# Patient Record
Sex: MALE | Race: WHITE | NOT HISPANIC OR LATINO | Employment: FULL TIME | URBAN - METROPOLITAN AREA
[De-identification: names, ages, dates, MRNs, and addresses within clinical notes are randomized per-mention and may not be internally consistent; named-entity substitution may affect disease eponyms.]

---

## 2018-08-23 DIAGNOSIS — I10 ESSENTIAL HYPERTENSION: ICD-10-CM

## 2018-08-23 DIAGNOSIS — M25.50 MULTIPLE JOINT PAIN: ICD-10-CM

## 2018-08-23 DIAGNOSIS — R53.82 CHRONIC FATIGUE: ICD-10-CM

## 2018-08-23 DIAGNOSIS — E66.01 OBESITY, CLASS III, BMI 40-49.9 (MORBID OBESITY) (HCC): Primary | ICD-10-CM

## 2018-08-23 PROBLEM — R06.83 SNORING: Status: ACTIVE | Noted: 2018-08-23

## 2018-08-23 PROBLEM — M10.9 GOUT: Status: ACTIVE | Noted: 2018-08-23

## 2018-08-24 ENCOUNTER — APPOINTMENT (OUTPATIENT)
Dept: LAB | Facility: HOSPITAL | Age: 43
End: 2018-08-24

## 2018-08-24 ENCOUNTER — CONSULT (OUTPATIENT)
Dept: BARIATRICS/WEIGHT MGMT | Facility: CLINIC | Age: 43
End: 2018-08-24

## 2018-08-24 VITALS
WEIGHT: 315 LBS | SYSTOLIC BLOOD PRESSURE: 148 MMHG | RESPIRATION RATE: 18 BRPM | HEART RATE: 76 BPM | HEIGHT: 73 IN | TEMPERATURE: 98.1 F | BODY MASS INDEX: 41.75 KG/M2 | DIASTOLIC BLOOD PRESSURE: 94 MMHG

## 2018-08-24 DIAGNOSIS — K21.9 GASTROESOPHAGEAL REFLUX DISEASE, ESOPHAGITIS PRESENCE NOT SPECIFIED: ICD-10-CM

## 2018-08-24 DIAGNOSIS — E66.01 OBESITY, CLASS III, BMI 40-49.9 (MORBID OBESITY) (HCC): Primary | ICD-10-CM

## 2018-08-24 DIAGNOSIS — I10 ESSENTIAL HYPERTENSION: ICD-10-CM

## 2018-08-24 DIAGNOSIS — R60.9 EDEMA, UNSPECIFIED TYPE: ICD-10-CM

## 2018-08-24 DIAGNOSIS — M25.50 MULTIPLE JOINT PAIN: ICD-10-CM

## 2018-08-24 DIAGNOSIS — K76.0 FATTY LIVER: ICD-10-CM

## 2018-08-24 DIAGNOSIS — E78.5 BORDERLINE HYPERLIPIDEMIA: ICD-10-CM

## 2018-08-24 DIAGNOSIS — R06.83 SNORING: ICD-10-CM

## 2018-08-24 PROBLEM — K82.9 GALLBLADDER PROBLEM: Status: RESOLVED | Noted: 2018-08-24 | Resolved: 2018-08-24

## 2018-08-24 PROBLEM — K82.9 GALLBLADDER PROBLEM: Status: ACTIVE | Noted: 2018-08-24

## 2018-08-24 LAB
ALBUMIN SERPL-MCNC: 4.7 G/DL (ref 3.5–5.2)
ALBUMIN/GLOB SERPL: 1.5 G/DL
ALP SERPL-CCNC: 59 U/L (ref 39–117)
ALT SERPL W P-5'-P-CCNC: 41 U/L (ref 1–41)
ANION GAP SERPL CALCULATED.3IONS-SCNC: 14.1 MMOL/L
AST SERPL-CCNC: 23 U/L (ref 1–40)
BASOPHILS # BLD AUTO: 0.04 10*3/MM3 (ref 0–0.2)
BASOPHILS NFR BLD AUTO: 0.4 % (ref 0–1.5)
BILIRUB SERPL-MCNC: 0.5 MG/DL (ref 0.1–1.2)
BUN BLD-MCNC: 18 MG/DL (ref 6–20)
BUN/CREAT SERPL: 16.1 (ref 7–25)
CALCIUM SPEC-SCNC: 10 MG/DL (ref 8.6–10.5)
CHLORIDE SERPL-SCNC: 99 MMOL/L (ref 98–107)
CHOLEST SERPL-MCNC: 192 MG/DL (ref 0–200)
CO2 SERPL-SCNC: 26.9 MMOL/L (ref 22–29)
CREAT BLD-MCNC: 1.12 MG/DL (ref 0.76–1.27)
DEPRECATED RDW RBC AUTO: 47.2 FL (ref 37–54)
EOSINOPHIL # BLD AUTO: 0.24 10*3/MM3 (ref 0–0.7)
EOSINOPHIL NFR BLD AUTO: 2.2 % (ref 0.3–6.2)
ERYTHROCYTE [DISTWIDTH] IN BLOOD BY AUTOMATED COUNT: 13.2 % (ref 11.5–14.5)
GFR SERPL CREATININE-BSD FRML MDRD: 72 ML/MIN/1.73
GLOBULIN UR ELPH-MCNC: 3.2 GM/DL
GLUCOSE BLD-MCNC: 89 MG/DL (ref 65–99)
HBA1C MFR BLD: 5.34 % (ref 4.8–5.6)
HCT VFR BLD AUTO: 50.2 % (ref 40.4–52.2)
HDLC SERPL-MCNC: 41 MG/DL (ref 40–60)
HGB BLD-MCNC: 16.2 G/DL (ref 13.7–17.6)
IMM GRANULOCYTES # BLD: 0.02 10*3/MM3 (ref 0–0.03)
IMM GRANULOCYTES NFR BLD: 0.2 % (ref 0–0.5)
LDLC SERPL CALC-MCNC: 113 MG/DL (ref 0–100)
LDLC/HDLC SERPL: 2.76 {RATIO}
LYMPHOCYTES # BLD AUTO: 3.37 10*3/MM3 (ref 0.9–4.8)
LYMPHOCYTES NFR BLD AUTO: 31.5 % (ref 19.6–45.3)
MCH RBC QN AUTO: 31.3 PG (ref 27–32.7)
MCHC RBC AUTO-ENTMCNC: 32.3 G/DL (ref 32.6–36.4)
MCV RBC AUTO: 97.1 FL (ref 79.8–96.2)
MONOCYTES # BLD AUTO: 0.75 10*3/MM3 (ref 0.2–1.2)
MONOCYTES NFR BLD AUTO: 7 % (ref 5–12)
NEUTROPHILS # BLD AUTO: 6.29 10*3/MM3 (ref 1.9–8.1)
NEUTROPHILS NFR BLD AUTO: 58.7 % (ref 42.7–76)
PLATELET # BLD AUTO: 228 10*3/MM3 (ref 140–500)
PMV BLD AUTO: 11.1 FL (ref 6–12)
POTASSIUM BLD-SCNC: 4.3 MMOL/L (ref 3.5–5.2)
PROT SERPL-MCNC: 7.9 G/DL (ref 6–8.5)
RBC # BLD AUTO: 5.17 10*6/MM3 (ref 4.6–6)
SODIUM BLD-SCNC: 140 MMOL/L (ref 136–145)
TRIGL SERPL-MCNC: 190 MG/DL (ref 0–150)
TSH SERPL DL<=0.05 MIU/L-ACNC: 2.26 MIU/ML (ref 0.27–4.2)
VLDLC SERPL-MCNC: 38 MG/DL (ref 5–40)
WBC NRBC COR # BLD: 10.71 10*3/MM3 (ref 4.5–10.7)

## 2018-08-24 PROCEDURE — 99205 OFFICE O/P NEW HI 60 MIN: CPT | Performed by: NURSE PRACTITIONER

## 2018-08-24 PROCEDURE — 80053 COMPREHEN METABOLIC PANEL: CPT | Performed by: NURSE PRACTITIONER

## 2018-08-24 PROCEDURE — 80061 LIPID PANEL: CPT | Performed by: NURSE PRACTITIONER

## 2018-08-24 PROCEDURE — 36415 COLL VENOUS BLD VENIPUNCTURE: CPT | Performed by: NURSE PRACTITIONER

## 2018-08-24 PROCEDURE — 84443 ASSAY THYROID STIM HORMONE: CPT | Performed by: NURSE PRACTITIONER

## 2018-08-24 PROCEDURE — 83036 HEMOGLOBIN GLYCOSYLATED A1C: CPT | Performed by: NURSE PRACTITIONER

## 2018-08-24 PROCEDURE — 85025 COMPLETE CBC W/AUTO DIFF WBC: CPT | Performed by: NURSE PRACTITIONER

## 2018-08-24 RX ORDER — CALCIUM CARBONATE 200(500)MG
1 TABLET,CHEWABLE ORAL AS NEEDED
COMMUNITY

## 2018-08-24 RX ORDER — INDOMETHACIN 50 MG/1
50 CAPSULE ORAL AS NEEDED
COMMUNITY
Start: 2018-08-23 | End: 2018-08-30

## 2018-08-24 RX ORDER — LOSARTAN POTASSIUM 50 MG/1
50 TABLET ORAL DAILY
Refills: 0 | COMMUNITY
Start: 2018-08-16

## 2018-08-24 NOTE — PROGRESS NOTES
"Bariatric Nutrition Counseling Interview    Patient Name:  Jonnie Pruitt  YOB: 1975  Age:  43 y.o.  Sex:  male  MRN: [id number]  Date:  08/24/18    Procedure Considering:  Bypass or Sleeve    Last Documented Height:    Ht Readings from Last 1 Encounters:   08/24/18 185.4 cm (73\")     Last Documented Weight:   Wt Readings from Last 1 Encounters:   08/24/18 (!) 165 kg (364 lb)      Body mass index is 48.02 kg/m².    Highest Weight:  364 lb.  Goal Weight: 250 lb.    History:  Past Medical History:   Diagnosis Date   • Environmental allergies    • Sinus infection      Past Surgical History:   Procedure Laterality Date   • LAPAROSCOPIC CHOLECYSTECTOMY  2005     Family History   Problem Relation Age of Onset   • Cancer Mother         bladder   • COPD Mother    • Stroke Father      Social History     Social History   • Marital status:    • Number of children: 1     Social History Main Topics   • Smoking status: Never Smoker   • Smokeless tobacco: Current User   • Alcohol use No   • Drug use: No   • Sexual activity: Defer     Other Topics Concern   • Not on file     Additional Health Issues to Consider:  HTN,Hyperlipidemia, joint pain, Gout    Weight History:  Always been overweight    Previous Weight Loss Efforts:  The [place] diet, Nutrisystem, Calorie counting  Most Successful Weight Loss Effort:  Nutrisystem,forty pound weight loss, not sustained    Eating Habits: Eat large portions, Eat out of boredom, Eat too fast  Eat three meals on most days?  Yes  Worst eating habit?  Snacking on high calorie foods    How often do you eat fast food? five times weekly    Do you exercise regularly? (at least 3 times each week)  No    Occupation:  , some physical activity required    Personal Goal After Procedure:  Jonnie wants to move freely without limitation     Personal Support:  family    Assessment:    Assessment:  We reviewed program requirements for weight loss success following surgery. " We discussed personal habits and lifestyle behaviors that may influence diet efforts. Program materials, including a reduced calorie diet were provided, discussed and recommended as the regimen to follow for pre and post diet planning. The significance of following an eating plan to include high fiber, low saturated fat food choices was emphasized. Jonnie demonstrated a good comprehension of diet       requirements as well as a commitment to work on personal challenges. He will make a good candidate for weight loss surgery                                                                                      Electronically signed by:  Sherron Stringer RD  08/24/18 4:32 PM

## 2018-08-24 NOTE — PROGRESS NOTES
[facility]  [address]  [facility] [id number] [phone]  [address]  [facility] [address] [phone]  Dept: [phone]  8/24/2018      Jonnie Pruitt.  [id number]  [id number]  1975  male      Chief Complaint of weight gain; unable to maintain weight loss    History of Present Illness:   Jonnie is a 43 y.o. male who presents today for evaluation, education and consultation regarding weight loss surgery. The patient is interested in the sleeve gastrectomy.      Diet History:Jonnie has been overweight for at least 35 years, has been 35 pounds or more overweight for at least 25 years, has been 100 pounds or more overweight for 20 or more years and started dieting at age 23.  The most weight Jonnie lost was 70 pounds on fasting and maintained the weight loss for 6 months. Jonnie describes his eating habits as volume, snacker and sweets eater. Jonnie Pruitt has tried Atkins, Nutrisystem, Fasting, reduced calorie, exercising and medications among others with success of losing up to 70 pounds, but in each instance regained the weight.      See dietician documentation for complete history.    Bariatric Surgery Evaluation: The patient is being seen for an initial visit for bariatric surgery evaluation.     Bariatric Co-morbidities:  sleep disturbances with possible sleep apnea, hypertension, knee pain, GERD and edema    Patient Active Problem List   Diagnosis   • Obesity, Class III, BMI 40-49.9 (morbid obesity) (CMS/HCC)   • Chronic fatigue   • Essential hypertension   • Snoring   • Multiple joint pain   • Gout   • Edema   • GERD (gastroesophageal reflux disease)   • Fatty liver   • Borderline hyperlipidemia       Past Medical History:   Diagnosis Date   • Environmental allergies    • Sinus infection        Past Surgical History:   Procedure Laterality Date   • LAPAROSCOPIC CHOLECYSTECTOMY  2005       Allergies   Allergen Reactions    • Penicillins Unknown (See Comments)         Current Outpatient Prescriptions:   •  calcium carbonate (TUMS) 500 MG chewable tablet, Chew 1 tablet As Needed., Disp: , Rfl:   •  indomethacin (INDOCIN) 50 MG capsule, Take 50 mg by mouth As Needed., Disp: , Rfl:   •  losartan (COZAAR) 50 MG tablet, Take 50 mg by mouth Daily., Disp: , Rfl: 0  •  Multiple Vitamins-Minerals (CENTRUM ADULTS PO), Take  by mouth., Disp: , Rfl:     Social History     Social History   • Marital status:      Spouse name: N/A   • Number of children: 1   • Years of education: N/A     Occupational History   • Not on file.     Social History Main Topics   • Smoking status: Never Smoker   • Smokeless tobacco: Current User   • Alcohol use No   • Drug use: No   • Sexual activity: Defer     Other Topics Concern   • Not on file     Social History Narrative   • No narrative on file       Family History   Problem Relation Age of Onset   • Cancer Mother         bladder   • COPD Mother    • Stroke Father          Review of Systems:  Review of Systems   All other systems reviewed and are negative.      Physical Exam:  Vital Signs:  Weight: (!) 165 kg (364 lb)   Body mass index is 48.02 kg/m².  Temp: 98.1 °F (36.7 °C)   Heart Rate: 76   BP: 148/94     Physical Exam   Constitutional: He is oriented to person, place, and time. He appears well-developed and well-nourished.   HENT:   Head: Normocephalic and atraumatic.   Eyes: EOM are normal.   Cardiovascular: Normal rate, regular rhythm and normal heart sounds.    Pulmonary/Chest: Effort normal and breath sounds normal.   Abdominal: Soft. Bowel sounds are normal. He exhibits no distension. There is no tenderness.   Musculoskeletal: Normal range of motion.   Neurological: He is alert and oriented to person, place, and time.   Skin: Skin is warm and dry.   Psychiatric: He has a normal mood and affect. His behavior is normal. Judgment and thought content normal.   Vitals reviewed.         Assessment:          Jonnie Pruitt is a 43 y.o. year old male with medically complicated severe obesity. Weight: (!) 165 kg (364 lb), Body mass index is 48.02 kg/m². and weight related problems including sleep disturbances with possible sleep apnea, hypertension, knee pain and edema.    I explained in detail the procedures that we are performing.  All of those procedures can be performed laparoscopically but there is a chance to convert to open if any technical challenges or complications do occur.  Bariatric surgery is not cosmetic surgery but rather a tool to help a patient make a life-long commitment lifestyle changes including diet, exercise, behavior changes, and taking supplemental vitamins and minerals.    Due to the patient's BMI and co-morbidities they are at a high risk for surgery and will obtain the following:  The patient has been advised that a letter of medical support and a history and physical must be obtained from his primary care physician. A psychological evaluation will be arranged for this patient. CBC, CMP, FLP, TSH and HgbA1C will be drawn. Jonnie Pruitt will obtain a pre-operative CXR and EKG.     Jonnie Pruitt will be set up for a pre-operative diagnostic esophagogastroduodenoscopy with biopsy for evaluation. The risks and benefits of the procedure were discussed with the patient in detail and all questions were answered.  Possibility of perforation, bleeding, aspiration, anoxic brain injury, respiratory and/or cardiac arrest and death were discussed.   He received handouts regarding, all questions were answered and informed consent was obtained.     The risks, benefits, alternatives, and potential complications of all of the procedures were explained in detail including, but not limited to death, anesthesia and medication adverse effect/DVT, pulmonary embolism, trocar site/incisional hernia, wound infection, abdominal infection, bleeding, failure to lose weight or gain weight and change in body image,  metabolic complications with calcium, thiamine, vitamin B12, folate, iron, and anemia.    The patient was advised to start a high protein, low fat and low carbohydrate diet. The patient was given individualized information by our dietician along with general group information and handouts.     The patient was given information regarding the BAYRON educational video. BAYRON is an internet based educational video which explains the surgical procedure and answers basic questions regarding the procedure. The patient was provided with instructions and a password to watch the video.    The patient was encouraged to start routine exercise including but not limited to 150 minutes per week. The patient received a resistance band along with a handout of exercises.     The consultation plan was reviewed with the patient.    The patient understands the surgical procedures and the different surgical options that are available.  He understands the lifestyle changes that would be required after surgery and has agreed to participate in a pre-operative and postoperative weight management program.  He also expressed understanding of possible risks, had several questions answered and desires to proceed.    I think he is a good candidate for this surgery, and is interested in a sleeve gastrectomy.    Encounter Diagnoses   Name Primary?   • Obesity, Class III, BMI 40-49.9 (morbid obesity) (CMS/[facility]) Yes   • Essential hypertension    • Snoring    • Fatty liver    • Gastroesophageal reflux disease, esophagitis presence not specified    • Multiple joint pain    • Edema, unspecified type    • Borderline hyperlipidemia        Plan:    Patient will have evaluations and follow up with bariatric dieticians and a psychologist before undergoing a multidisciplinary review of his candidacy.  We also discussed the weight loss requirement and rationale, and other program requirements.      Mari Woodward, APRN  8/24/2018

## 2023-02-15 NOTE — PROGRESS NOTES
[facility]  [address]  [place] [address]  Phone   Fax       Jonnie Pruitt  [id number]   1975  47 y.o.  male      Referring Provider: Dr. Jesse Stephen  PCP: Dr. Blanco    Type of service: Initial Sleep Medicine Consult.  Date of service: 2/16/2023      CHIEF COMPLAINT: Obstructive sleep apnea      HISTORY OF PRESENT ILLNESS:  Thank you for asking to see Jonnie Pruitt, 47 y.o.. Jonnie Pruitt was seen today on 2/16/2023 at [facility].  Patient presents today as a new patient for evaluation of obstructive sleep apnea.  His urologist ordered a home sleep study and he had a [employer]am home sleep study with [employer] night 1 device done 11/28/2022 showing moderate obstructive sleep apnea with DILMA of 11.3 patient does have snoring, morning headaches, awakens with sore throat/dry mouth, grinds his teeth at night, and awakens frequently throughout the night.  He has a hard time staying asleep and does feel tired during the day.      SLEEP HISTORY:  Sleep schedule:  Bedtime: 8 AM  Wake time: 12 PM to 1 PM  Normally takes about 2-5 minutes to fall asleep  Average hours of sleep: 4 to 5 hours  Number of naps per day: 2    Symptoms:   In addition to snoring, nonrestorative sleep and witnessed apneas patient gives the following associated symptoms:  Have you ever awakened gasping for breath, coughing, choking: No   Change in weight:  Yes, lost 25 pounds in last 5 years  Morning headaches:  Yes   Awaken with a sore throat or dry mouth:  Yes   Leg jerking at night:  No   Crawly feeling/urge sensation to move in the legs: No   Teeth grinding: Yes   Have you ever awakened at night with a sour taste or burning sensation in your chest:  No   Do you have muscle weakness with laughing or anger or sleep paralysis:  No   Have you ever felt paralyzed while going to sleep or waking up:  No   Sleepwalking or nightmares: No   Nocturia  "(urination at night): 2-3 times per night  Memory Problem: No     Medical Conditions (PMH):   1. Severe obesity  2. Hyperlipidemia  3. Erectile dysfunction  4. Chronic prostatitis  5. Hypogonadism    Social history:  Shift work:  Yes   Tobacco use:  Yes, chew tobacco  Alcohol use: .5 per week  Caffeinated drinks: 2-3 per day  Occupation:     Family History (parents and siblings) (pertaining to sleep medicine):  1. No relevant family history    Medications: reviewed    Allergies:  Penicillins      REVIEW OF SYSTEMS:  Pertinent positive symptoms are:  • Snoring  • Daytime excessive sleepiness with [place] Sleepiness Scale of Total score: 5   • Frequent urination      PHYSICAL EXAM:  CONSTITUTINONAL:  Vitals:    02/16/23 1407   BP: 135/77   Pulse: 60   SpO2: 98%   Weight: (!) 156 kg (344 lb)   Height: 185.4 cm (73\")    Body mass index is 45.39 kg/m².   HEAD: atraumatic, normocephalic   NOSE: nasal passages are clear  THROAT: Mallampati class II  NECK: Neck Circumference: 17 inches, trachea is midline  RESPIRATORY SYSTEM: Breath sounds are normal, breathing unlabored, no wheezing present on exam  CARDIOVASULAR SYSTEM: Heart sounds are regular rhythm and normal rate, no edema  NEUROLOGICAL SYSTEM: Alert and oriented x 3, normal gait  PSYCHIATRIC SYSTEM: Mood is normal/ appropriate     Office notes from care team reviewed. Office note dated 1/30/23, reviewed.        ASSESSMENT AND PLAN:   · Obstructive sleep apnea: with DILMA 11.3 on recent home sleep study.  With hx hypertension, urinary frequency, obesity, would recommend treatment.  Discussed pathophysiology of sleep apnea with patient in detail as well as risks of untreated sleep apnea.  Discussed treatment with CPAP in detail with patient.  Have sent in prescription for auto CPAP 8 to 16 cm H2O to DME company and will have him follow-up 31 days after initiation for compliance check or he will contact the office sooner for issues or concerns.  · Excessive " daytime sleepiness: [place] Sleepiness Scale of Total score: 5.  There are many causes of excessive daytime sleepiness including but not limited to sleep apnea, shiftwork syndrome, depression, and other medical disorders such as heart disease, kidney disease, and liver failure.  The most serious cause of excessive sleepiness is due to neurological conditions such as narcolepsy/cataplexy.  More commonly excessive sleepiness is caused by sleep apnea with frequent awakenings during sleep time.  I have discussed safety of driving and to remain vigilant while driving.  · Severe obesity: Body mass index is 45.39 kg/m².. Patients who are overweight or obese are at increased risk of sleep apnea/ sleep disordered breathing. Weight reduction and healthy lifestyle are encouraged in overweight/ obese patients as part of a comprehensive approach to sleep apnea treatment.    · Hypertension  · Frequent urination  · Chronic prostatitis  · Hypogonadism  · Hyperlipidemia      Patient will follow-up 31 to 90 days after PAP set up or contact the office sooner for questions or concerns. Patient's questions were answered.            Thank you again for asking me to consult on this patient.  Please do not hesitate to call me if you have additional questions or concerns.       Flavia Bowen DNP, APRN  [facility]

## 2023-02-16 ENCOUNTER — OFFICE VISIT (OUTPATIENT)
Dept: SLEEP MEDICINE | Facility: HOSPITAL | Age: 48
End: 2023-02-16
Payer: COMMERCIAL

## 2023-02-16 VITALS
HEART RATE: 60 BPM | OXYGEN SATURATION: 98 % | DIASTOLIC BLOOD PRESSURE: 77 MMHG | SYSTOLIC BLOOD PRESSURE: 135 MMHG | WEIGHT: 315 LBS | BODY MASS INDEX: 41.75 KG/M2 | HEIGHT: 73 IN

## 2023-02-16 DIAGNOSIS — G47.33 OSA (OBSTRUCTIVE SLEEP APNEA): Primary | ICD-10-CM

## 2023-02-16 PROCEDURE — 99204 OFFICE O/P NEW MOD 45 MIN: CPT | Performed by: NURSE PRACTITIONER

## 2023-02-16 PROCEDURE — G0463 HOSPITAL OUTPT CLINIC VISIT: HCPCS

## 2023-02-22 ENCOUNTER — TELEPHONE (OUTPATIENT)
Dept: SLEEP MEDICINE | Facility: HOSPITAL | Age: 48
End: 2023-02-22
Payer: COMMERCIAL

## 2023-03-08 ENCOUNTER — TELEPHONE (OUTPATIENT)
Dept: SLEEP MEDICINE | Facility: HOSPITAL | Age: 48
End: 2023-03-08
Payer: COMMERCIAL

## 2023-03-08 NOTE — TELEPHONE ENCOUNTER
Called patient to make sure he got message about CPAP and was able to pick this up.  He did not answer, left voicemail asking him to call the office back.